# Patient Record
Sex: MALE | Race: WHITE
[De-identification: names, ages, dates, MRNs, and addresses within clinical notes are randomized per-mention and may not be internally consistent; named-entity substitution may affect disease eponyms.]

---

## 2017-10-23 ENCOUNTER — HOSPITAL ENCOUNTER (OUTPATIENT)
Dept: HOSPITAL 19 - ZLAB.WCH | Age: 67
End: 2017-10-23

## 2017-10-23 DIAGNOSIS — Z01.89: Primary | ICD-10-CM

## 2017-10-23 LAB — TSH SERPL DL<=0.005 MIU/L-ACNC: 3 UIU/ML (ref 0.47–4.68)

## 2018-07-23 ENCOUNTER — HOSPITAL ENCOUNTER (OUTPATIENT)
Dept: HOSPITAL 19 - ZLAB.WCH | Age: 68
End: 2018-07-23

## 2018-07-23 DIAGNOSIS — Z01.89: Primary | ICD-10-CM

## 2018-07-23 PROCEDURE — G0103 PSA SCREENING: HCPCS

## 2021-02-03 ENCOUNTER — HOSPITAL ENCOUNTER (OUTPATIENT)
Dept: HOSPITAL 19 - SDCO | Age: 71
Discharge: HOME | End: 2021-02-03
Attending: SURGERY
Payer: MEDICARE

## 2021-02-03 VITALS — WEIGHT: 193.79 LBS | BODY MASS INDEX: 27.13 KG/M2 | HEIGHT: 71 IN

## 2021-02-03 VITALS — DIASTOLIC BLOOD PRESSURE: 69 MMHG | HEART RATE: 54 BPM | SYSTOLIC BLOOD PRESSURE: 115 MMHG

## 2021-02-03 VITALS — SYSTOLIC BLOOD PRESSURE: 118 MMHG | HEART RATE: 65 BPM | DIASTOLIC BLOOD PRESSURE: 66 MMHG

## 2021-02-03 VITALS — HEART RATE: 55 BPM | DIASTOLIC BLOOD PRESSURE: 67 MMHG | SYSTOLIC BLOOD PRESSURE: 112 MMHG

## 2021-02-03 VITALS — TEMPERATURE: 98 F | SYSTOLIC BLOOD PRESSURE: 121 MMHG | DIASTOLIC BLOOD PRESSURE: 71 MMHG | HEART RATE: 54 BPM

## 2021-02-03 VITALS — TEMPERATURE: 97.7 F | SYSTOLIC BLOOD PRESSURE: 114 MMHG | DIASTOLIC BLOOD PRESSURE: 67 MMHG | HEART RATE: 60 BPM

## 2021-02-03 VITALS — HEART RATE: 56 BPM | SYSTOLIC BLOOD PRESSURE: 118 MMHG | DIASTOLIC BLOOD PRESSURE: 65 MMHG

## 2021-02-03 VITALS — DIASTOLIC BLOOD PRESSURE: 65 MMHG | HEART RATE: 68 BPM | SYSTOLIC BLOOD PRESSURE: 108 MMHG

## 2021-02-03 DIAGNOSIS — K40.91: Primary | ICD-10-CM

## 2021-02-03 DIAGNOSIS — Z88.1: ICD-10-CM

## 2021-02-03 DIAGNOSIS — Z88.0: ICD-10-CM

## 2021-02-03 DIAGNOSIS — K21.9: ICD-10-CM

## 2021-02-03 DIAGNOSIS — Z79.899: ICD-10-CM

## 2021-02-03 DIAGNOSIS — Z20.822: ICD-10-CM

## 2021-02-03 DIAGNOSIS — G47.33: ICD-10-CM

## 2021-02-03 LAB
ANION GAP SERPL CALC-SCNC: 8 MMOL/L (ref 7–16)
BASOPHILS # BLD: 0 10*3/UL (ref 0–0.2)
BASOPHILS NFR BLD AUTO: 0.4 % (ref 0–2)
BUN SERPL-MCNC: 17 MG/DL (ref 9–20)
CALCIUM SERPL-MCNC: 8.9 MG/DL (ref 8.4–10.2)
CHLORIDE SERPL-SCNC: 105 MMOL/L (ref 98–107)
CO2 SERPL-SCNC: 26 MMOL/L (ref 22–30)
CREAT SERPL-SCNC: 1.09 UMOL/L (ref 0.66–1.25)
EOSINOPHIL # BLD: 0.1 10*3/UL (ref 0–0.7)
EOSINOPHIL NFR BLD: 2 % (ref 0–4)
ERYTHROCYTE [DISTWIDTH] IN BLOOD BY AUTOMATED COUNT: 14.4 % (ref 11.5–14.5)
GLUCOSE SERPL-MCNC: 108 MG/DL (ref 74–106)
GRANULOCYTES # BLD AUTO: 52.8 % (ref 42.2–75.2)
HCT VFR BLD AUTO: 43.1 % (ref 42–52)
HGB BLD-MCNC: 14.6 G/DL (ref 13.5–18)
LYMPHOCYTES # BLD: 1.7 10*3/UL (ref 1.2–3.4)
LYMPHOCYTES NFR BLD: 33 % (ref 20–51)
MCH RBC QN AUTO: 31 PG (ref 27–31)
MCHC RBC AUTO-ENTMCNC: 34 G/DL (ref 33–37)
MCV RBC AUTO: 92 FL (ref 80–100)
MONOCYTES # BLD: 0.6 10*3/UL (ref 0.1–0.6)
MONOCYTES NFR BLD AUTO: 11.4 % (ref 1.7–9.3)
NEUTROPHILS # BLD: 2.6 10*3/UL (ref 1.4–6.5)
PLATELET # BLD AUTO: 169 K/MM3 (ref 130–400)
PMV BLD AUTO: 11.1 FL (ref 7.4–10.4)
POTASSIUM SERPL-SCNC: 3.9 MMOL/L (ref 3.4–5)
RBC # BLD AUTO: 4.67 M/MM3 (ref 4.2–5.6)
SODIUM SERPL-SCNC: 139 MMOL/L (ref 137–145)

## 2021-02-03 PROCEDURE — C1781 MESH (IMPLANTABLE): HCPCS

## 2021-02-03 NOTE — NUR
Pt [place] [facility] from PACU via cart.  VSS-see flowsheet. Pt oriented
but drowsy.  X3 surgical incisions to abdomen noted with peña set, clean, dry
and intact.  Pt requested to rest at this time.  Denied complaints or needs.
Side rails up.  VS monitor in place for post op vitals.

## 2021-02-03 NOTE — NUR
Pt has tolerated drinking cranberry juice and given a vanilla pudding per
request.  HOB elevated to eat, pt able to scoot self up in bed without
difficulty.  Denied needs or complaints at this time.  VS remain stable-see
flowsheet.

## 2021-02-03 NOTE — NUR
Dismissal instructions given and signed.  Patient voices understanding of
these.  Informed that Ultram script was sent to [facility].

## 2021-02-03 NOTE — NUR
VS remain stable. Pt opened eyes as this RN entered room.  Denied needs at
this time.  Continues to rest with side rails up and call light in reach.

## 2021-02-03 NOTE — NUR
Patient dismissed to home driven by spouse and taken to the front door per
wheelchair by this RN and assisted into vehicle with dismissal instructions in
hand.

## 2021-02-03 NOTE — NUR
Pt sat up on side of bed and stood at bedside without difficulty.  Denies
complaints.  Tolerated pudding and given chicken noodle soup per request.
Sitting with HOB elevated eating soup.

## 2021-02-03 NOTE — NUR
Patient assisted up to the bathroom and is able to void and returns to room.
States that his urine was blood tinged and had burning with urination.
Informed of having cytoscopy in surgery to look at bladder.  Voices
understanding of this.  Instructed to force fluids to help clear urine and
call the office if unable to empty bladder.

## 2024-09-09 ENCOUNTER — HOSPITAL ENCOUNTER (OUTPATIENT)
Dept: HOSPITAL 19 - SDCO | Age: 74
Discharge: HOME | End: 2024-09-09
Attending: INTERNAL MEDICINE
Payer: MEDICARE

## 2024-09-09 VITALS — SYSTOLIC BLOOD PRESSURE: 100 MMHG | DIASTOLIC BLOOD PRESSURE: 68 MMHG | HEART RATE: 51 BPM

## 2024-09-09 VITALS — WEIGHT: 222.01 LBS | HEIGHT: 71 IN | BODY MASS INDEX: 31.08 KG/M2

## 2024-09-09 VITALS — DIASTOLIC BLOOD PRESSURE: 47 MMHG | SYSTOLIC BLOOD PRESSURE: 102 MMHG | HEART RATE: 53 BPM

## 2024-09-09 VITALS — HEART RATE: 54 BPM | DIASTOLIC BLOOD PRESSURE: 68 MMHG | SYSTOLIC BLOOD PRESSURE: 109 MMHG

## 2024-09-09 VITALS — HEART RATE: 60 BPM | TEMPERATURE: 97 F | SYSTOLIC BLOOD PRESSURE: 127 MMHG | DIASTOLIC BLOOD PRESSURE: 82 MMHG

## 2024-09-09 VITALS — TEMPERATURE: 97 F

## 2024-09-09 DIAGNOSIS — K31.89: ICD-10-CM

## 2024-09-09 DIAGNOSIS — K31.7: Primary | ICD-10-CM

## 2024-09-09 DIAGNOSIS — Z86.010: ICD-10-CM

## 2024-09-09 DIAGNOSIS — K64.0: ICD-10-CM

## 2024-09-09 DIAGNOSIS — Q43.8: ICD-10-CM

## 2024-09-09 DIAGNOSIS — K21.9: ICD-10-CM

## 2024-09-09 DIAGNOSIS — Z12.11: ICD-10-CM

## 2024-09-09 DIAGNOSIS — K44.9: ICD-10-CM

## 2024-09-09 PROCEDURE — 43239 EGD BIOPSY SINGLE/MULTIPLE: CPT

## 2024-09-09 NOTE — NUR
1005 PATIENT RETURNS TO [facility] VIA CART. PT AWAKE AND ALERT. RESPIRATIONS
UNLABORED. AMBULATED TO RECLINER CHAIR WITH 2:1 SBA. PT DENIES NAUSEA OR
ABDOMINAL PAIN. HOOKED UP TO MONITOR AND VS OBTAINED. CALL LIGHT AT SIDE AND
WIFE PRESENT.
 
 
1010 PATIENT TOLERATING HOT TEA, WATER
AND BLUEBERRY MUFFIN, VANILLA ICE CREAM WITHOUT NAUSEA OR DIFFICULTY
SWALLOWING.
 
 
1025 DR. SILVER IN ROOM SPEAKING WITH PATIENT.
 
 
1035 D/C
INSTRUCTIONS REVIEWED WITH PATIENT. PT VERBALIZED UNDERSTANDING AND A COPY
OF INSTRUCTIONS PROVIDED IN D/C FOLDER.
 
 
1045 PATIENT DRESSES SELF.
 
 
1100 PATIENT
DISCHARGED FROM UNIT VIA W/C TO A PERSONAL VEHICLE. PT LEFT HOSPITAL
IN STABLE CONDITION.